# Patient Record
Sex: MALE | Race: OTHER | URBAN - METROPOLITAN AREA
[De-identification: names, ages, dates, MRNs, and addresses within clinical notes are randomized per-mention and may not be internally consistent; named-entity substitution may affect disease eponyms.]

---

## 2017-12-18 ENCOUNTER — EMERGENCY (EMERGENCY)
Facility: HOSPITAL | Age: 42
LOS: 1 days | Discharge: HOME | End: 2017-12-18
Admitting: EMERGENCY MEDICINE
Payer: OTHER MISCELLANEOUS

## 2017-12-18 VITALS
TEMPERATURE: 99 F | OXYGEN SATURATION: 98 % | SYSTOLIC BLOOD PRESSURE: 147 MMHG | DIASTOLIC BLOOD PRESSURE: 80 MMHG | HEART RATE: 95 BPM | HEIGHT: 64 IN | RESPIRATION RATE: 16 BRPM | WEIGHT: 199.74 LBS
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
## 2017-12-18 DIAGNOSIS — Z23 ENCOUNTER FOR IMMUNIZATION: ICD-10-CM
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
## 2017-12-18 DIAGNOSIS — S61.211A LACERATION WITHOUT FOREIGN BODY OF LEFT INDEX FINGER WITHOUT DAMAGE TO NAIL, INITIAL ENCOUNTER: ICD-10-CM
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
## 2017-12-18 PROCEDURE — 90715 TDAP VACCINE 7 YRS/> IM: CPT

## 2017-12-18 PROCEDURE — 12001 RPR S/N/AX/GEN/TRNK 2.5CM/<: CPT

## 2017-12-18 PROCEDURE — 90471 IMMUNIZATION ADMIN: CPT

## 2017-12-18 PROCEDURE — 99283 EMERGENCY DEPT VISIT LOW MDM: CPT | Mod: 25

## 2017-12-18 RX ORDER — TETANUS TOXOID, REDUCED DIPHTHERIA TOXOID AND ACELLULAR PERTUSSIS VACCINE, ADSORBED 5; 2.5; 8; 8; 2.5 [IU]/.5ML; [IU]/.5ML; UG/.5ML; UG/.5ML; UG/.5ML
0.5 SUSPENSION INTRAMUSCULAR ONCE
Qty: 0 | Refills: 0 | Status: COMPLETED | OUTPATIENT
Start: 2017-12-18 | End: 2017-12-18

## 2017-12-18 RX ADMIN — TETANUS TOXOID, REDUCED DIPHTHERIA TOXOID AND ACELLULAR PERTUSSIS VACCINE, ADSORBED 0.5 MILLILITER(S): 5; 2.5; 8; 8; 2.5 SUSPENSION INTRAMUSCULAR at 17:02

## 2017-12-18 NOTE — ED PROVIDER NOTE - CARE PLAN
Principal Discharge DX:	Laceration of index finger without complication  Instructions for follow-up, activity and diet:	Please return in 1 week for suture removal. Please keep wound clean and dry for the next 24 hours.

## 2017-12-18 NOTE — ED ADULT TRIAGE NOTE - OTHER COMPLAINTS
pt c.o lac to L pointer finger after cutting it with a knife. pressure applied to site. denies taking daily blood thinner.

## 2017-12-18 NOTE — ED PROVIDER NOTE - MEDICAL DECISION MAKING DETAILS
43 y/o male with laceration to his left 2nd digit from a knife. Pt is right hand dominant. PE: sensation intact with no numbing/tingling. FROM of DIP and PIP. Laceration 2 cm located distal radial. 6 stitches. tetanus vaccination updated. pt advised to follow up in 1 week for removal.

## 2017-12-18 NOTE — ED PROVIDER NOTE - OBJECTIVE STATEMENT
43 y/o male with no PMhx is present with a laceration to his left hand. Pt states that the incident occurred at work. 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 Pt states that bleeding was controlled with pressure. He denies numbing/tingling sensation. Denies tetanus vaccination.

## 2020-09-04 NOTE — ED ADULT NURSE NOTE - AREA
[address] [employer] returned call     Cristino Romero  09/04/20 [id number]
IV attempt x 3 to left arm w/o success. Pressure dressing applied to sites. Jeramie Aguirre RN  09/04/20 [id number]
Pt ambulated in room with pulse ox. Oxygen level maintained at 99% on room air. No distress noted. RR unlabored.       Vanessa Velazquez RN  09/04/20 [id number]
distal
10